# Patient Record
Sex: FEMALE | Race: WHITE | NOT HISPANIC OR LATINO | Employment: FULL TIME | URBAN - METROPOLITAN AREA
[De-identification: names, ages, dates, MRNs, and addresses within clinical notes are randomized per-mention and may not be internally consistent; named-entity substitution may affect disease eponyms.]

---

## 2020-05-24 ENCOUNTER — HOSPITAL ENCOUNTER (EMERGENCY)
Facility: HOSPITAL | Age: 75
Discharge: HOME | End: 2020-05-24
Attending: EMERGENCY MEDICINE
Payer: MEDICARE

## 2020-05-24 VITALS
HEIGHT: 65 IN | WEIGHT: 190 LBS | SYSTOLIC BLOOD PRESSURE: 125 MMHG | BODY MASS INDEX: 31.65 KG/M2 | TEMPERATURE: 98 F | OXYGEN SATURATION: 96 % | RESPIRATION RATE: 16 BRPM | HEART RATE: 81 BPM | DIASTOLIC BLOOD PRESSURE: 81 MMHG

## 2020-05-24 DIAGNOSIS — S90.562A INSECT BITE OF LEFT ANKLE, INITIAL ENCOUNTER: ICD-10-CM
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
## 2020-05-24 PROCEDURE — 99284 EMERGENCY DEPT VISIT MOD MDM: CPT | Mod: ER

## 2020-05-24 PROCEDURE — 25000003 PHARM REV CODE 250: Mod: ER | Performed by: NURSE PRACTITIONER

## 2020-05-24 RX ORDER — MUPIROCIN 20 MG/G
OINTMENT TOPICAL
Status: COMPLETED | OUTPATIENT
Start: 2020-05-24 | End: 2020-05-24

## 2020-05-24 RX ORDER — CLINDAMYCIN HYDROCHLORIDE 150 MG/1
300 CAPSULE ORAL 4 TIMES DAILY
Qty: 56 CAPSULE | Refills: 0 | Status: SHIPPED | OUTPATIENT
Start: 2020-05-24 | End: 2020-05-31

## 2020-05-24 RX ORDER — HYDROCORTISONE 1 %
CREAM (GRAM) TOPICAL
Qty: 30 G | Refills: 0 | Status: SHIPPED | OUTPATIENT
Start: 2020-05-24

## 2020-05-24 RX ADMIN — MUPIROCIN 1 TUBE: 20 OINTMENT TOPICAL at 03:05

## 2020-05-24 NOTE — ED NOTES
Appearance:  Pt awake, alert & oriented to person, place & time.  Pt in no acute distress at present time.  Skin:  Skin warm, dry & intact.  Mucous membranes moist.  Skin turgor normal.   Insect bite marks to left lateral ankle, left middle knuckle and left lower leg.   Respiratory:  Respirations even, non-labored.    Neurologic:  Pt moving all extremities without difficulty.  Sensation intact.     Peripheral Vascular:  All peripheral pulses present.

## 2020-05-24 NOTE — ED TRIAGE NOTES
Pt states she began having itching to the outside of her left ankle on Tuesday night.  Now having swelling ankle area.  Pt also c/o bite to left middle finger knucke and left lower leg.

## 2020-05-24 NOTE — ED PROVIDER NOTES
Encounter Date: 5/24/2020    SCRIBE #1 NOTE: IZuly, am scribing for, and in the presence of,  Sonali HERNANDEZ. I have scribed the following portions of the note - Other sections scribed: HPI, ROS, PE.       History     Chief Complaint   Patient presents with    Insect Bite     Insect bites x 4 x two days. C/o itching, denies pain.     Brigida Estevez is a 75 y.o. female who presents to the ED complaining of multiple insect bites for x4 days.  Pt reports having more irritation on her left ankle, it has been more red and itchy with some swelling.  Pt is concerned that she may have an infection.  Patient denies fatigue, fever, rash, chest pain, SOB, numbness, weakness, tingling, abdominal pain, back pain, dysuria, hematuria, nausea, vomiting, diarrhea, or any other complaints.  She denies pain at present and has not tried any medications for her symptoms.      The history is provided by the patient.     Review of patient's allergies indicates:   Allergen Reactions    Bee pollens      Past Medical History:   Diagnosis Date    High cholesterol     Hypertension      Past Surgical History:   Procedure Laterality Date    CYST REMOVAL      DILATION AND CURETTAGE OF UTERUS       History reviewed. No pertinent family history.  Social History     Tobacco Use    Smoking status: Never Smoker   Substance Use Topics    Alcohol use: Yes     Comment: occasional wine    Drug use: Not on file     Review of Systems   Constitutional: Negative for chills and fever.   HENT: Negative for congestion, ear pain, rhinorrhea, sore throat and trouble swallowing.    Eyes: Negative for pain, discharge and redness.   Respiratory: Negative for cough and shortness of breath.    Cardiovascular: Negative for chest pain.   Gastrointestinal: Negative for abdominal pain, diarrhea, nausea and vomiting.   Genitourinary: Negative for decreased urine volume and dysuria.   Musculoskeletal: Negative for back pain, neck pain and neck stiffness.    Skin: Positive for wound. Negative for color change and rash.   Neurological: Negative for dizziness, weakness, light-headedness, numbness and headaches.   Psychiatric/Behavioral: Negative for confusion.   All other systems reviewed and are negative.      Physical Exam     Initial Vitals [05/24/20 1435]   BP Pulse Resp Temp SpO2   125/81 81 16 97.9 °F (36.6 °C) 96 %      MAP       --         Physical Exam    Nursing note and vitals reviewed.  Constitutional: Vital signs are normal. She appears well-developed.  Non-toxic appearance. She does not appear ill.   HENT:   Head: Normocephalic and atraumatic.   Eyes: Conjunctivae are normal.   Neck: Normal range of motion.   Cardiovascular: Normal rate and regular rhythm.   Pulmonary/Chest: Effort normal and breath sounds normal. She exhibits no tenderness.   Abdominal: Soft. There is no tenderness.   Neurological: She is alert and oriented to person, place, and time. Gait normal. GCS eye subscore is 4. GCS verbal subscore is 5. GCS motor subscore is 6.   Skin: Skin is warm, dry and intact. Capillary refill takes less than 2 seconds. Rash (Insect bite.) noted. No ecchymosis noted. There is erythema.   Multiple insect bites to BLE with one on the left lateral malleolus with swelling, erythema, and scabbing; no tenderness, and no drainage   Psychiatric: She has a normal mood and affect. Her speech is normal and behavior is normal. Judgment and thought content normal.         ED Course   Procedures  Labs Reviewed - No data to display       Imaging Results    None                      APC / Resident Notes:   This is an evaluation of a 75 y.o. female that presents to the Emergency Department for insect bite    Physical Exam shows a non-toxic, afebrile, and well appearing female. Multiple insect bites to BLE with one on the left lateral malleolus with swelling, erythema, and scabbing; no tenderness, and no drainage    Vital signs are reassuring. If available, previous records  reviewed.     My overall impression is infected insect bite. I considered, but at this time, do not suspect allergic reaction, SJS, TEN, cellulitis, abscess.    ED Course: Mupirocin. D/C Meds: hydrocortisone, clindamycin. D/C Information: f/u, medications. The diagnosis, treatment plan, instructions for follow-up and reevaluation with PCP as well as ED return precautions were discussed and understanding was verbalized. All questions or concerns have been addressed.     This case was discussed with and the patient has been examined by Dr. Son who is in agreement with my assessment and plan.          Scribe Attestation:   Scribe #1: I performed the above scribed service and the documentation accurately describes the services I performed. I attest to the accuracy of the note.    Physician Attestation for Scribe:  Physician Attestation Statement for Scribe: I, JOSÉ MIGUEL Galvan, reviewed documentation, as scribed by Zuly Linn in my presence, and it is both accurate and complete.                         Clinical Impression:     1. Bug bite with infection, initial encounter    2. Insect bite of left ankle, initial encounter            Disposition:   Disposition: Discharged  Condition: Stable     ED Disposition Condition    Discharge Stable        ED Prescriptions     Medication Sig Dispense Start Date End Date Auth. Provider    clindamycin (CLEOCIN) 150 MG capsule Take 2 capsules (300 mg total) by mouth 4 (four) times daily. for 7 days 56 capsule 5/24/2020 5/31/2020 JOSÉ MIGUEL Blackman    hydrocortisone 1 % cream Apply to affected area 2 times daily 30 g 5/24/2020  JOSÉ MIGUEL Blackman        Follow-up Information     Follow up With Specialties Details Why Contact Info    Andrade Rangel MD Family Medicine Schedule an appointment as soon as possible for a visit in 2 days  [address] [facility]  [address]  [facility]  [facility] [address] [phone]      [facility] Emergency Department Emergency  Medicine Go to  If symptoms worsen [address] [facility] [id number] [phone]                                     Tamra Lyon, P  05/24/20 3396